# Patient Record
Sex: MALE | Race: WHITE
[De-identification: names, ages, dates, MRNs, and addresses within clinical notes are randomized per-mention and may not be internally consistent; named-entity substitution may affect disease eponyms.]

---

## 2017-08-25 ENCOUNTER — HOSPITAL ENCOUNTER (OUTPATIENT)
Dept: HOSPITAL 47 - LABWHC1 | Age: 57
Discharge: HOME | End: 2017-08-25
Payer: COMMERCIAL

## 2017-08-25 DIAGNOSIS — C61: Primary | ICD-10-CM

## 2017-08-25 PROCEDURE — 84153 ASSAY OF PSA TOTAL: CPT

## 2017-08-25 PROCEDURE — 36415 COLL VENOUS BLD VENIPUNCTURE: CPT

## 2018-01-26 ENCOUNTER — HOSPITAL ENCOUNTER (OUTPATIENT)
Dept: HOSPITAL 47 - LABWHC1 | Age: 58
Discharge: HOME | End: 2018-01-26
Attending: RADIOLOGY
Payer: COMMERCIAL

## 2018-01-26 DIAGNOSIS — C61: Primary | ICD-10-CM

## 2018-01-26 PROCEDURE — 36415 COLL VENOUS BLD VENIPUNCTURE: CPT

## 2018-01-26 PROCEDURE — 84153 ASSAY OF PSA TOTAL: CPT

## 2018-08-14 ENCOUNTER — HOSPITAL ENCOUNTER (OUTPATIENT)
Dept: HOSPITAL 47 - LABWHC1 | Age: 58
Discharge: HOME | End: 2018-08-14
Attending: RADIOLOGY
Payer: COMMERCIAL

## 2018-08-14 DIAGNOSIS — C61: Primary | ICD-10-CM

## 2018-08-14 PROCEDURE — 84153 ASSAY OF PSA TOTAL: CPT

## 2018-08-14 PROCEDURE — 36415 COLL VENOUS BLD VENIPUNCTURE: CPT

## 2018-09-28 ENCOUNTER — HOSPITAL ENCOUNTER (OUTPATIENT)
Dept: HOSPITAL 47 - RADCTMAIN | Age: 58
Discharge: HOME | End: 2018-09-28
Attending: RADIOLOGY
Payer: COMMERCIAL

## 2018-09-28 DIAGNOSIS — Z12.2: Primary | ICD-10-CM

## 2018-09-28 DIAGNOSIS — Z87.891: ICD-10-CM

## 2018-09-28 NOTE — CTL
EXAMINATION TYPE:  CT Low Dose Lung

 

DATE OF EXAM ORDERED: 9/28/2018

 

COMPARISON:

 

HISTORY: . Low Dose CT Lung Screening

 

CT DLP: 113.5 mGycm

CT CTDI: 3.00 mGy

 

IV CONTRAST USED: None.

SCREENING VISIT: First visit

COMPARISON: None.

 

TECHNIQUE: Low dose computed tomography scan was performed through the chest at 1 millimeter thick se
ctions and reconstructed images in the coronal plane at 1 mm thick sections.

 

CT DIAGNOSTIC QUALITY:  Satisfactory

 

FINDINGS:

 

LUNG NODULES:  Not present

 

Left lung: no nodules identified.

Right lung: no nodules identified.

 

LUNGS:

COPD: Severity: None  

Fibrosis: Severity:None   

Lymph nodes: None  

Other findings: None  

 

RIGHT PLEURAL SPACE: 

Effusion: None  

Calcification: None   

Thickening: None  

Pneumothorax: None  

 

LEFT PLEURAL SPACE: 

Effusion: None  

Calcification: None  

Thickening: None   

Pneumothorax: None  

 

HEART:  

Heart Size: Mildly enlarged

Coronary calcification: Mild 

Pericardial effusion: None   

 

OTHER FINDINGS:

Upper abdomen: No significant abnormality

Bony thorax: Degenerative changes

Supraclavicular region: No significant abnormalityOther: No significant abnormalityI

 

IMPRESSION: Negative

 

FOLLOW UP CT CHEST 

 

RECOMMENDATION: Follow-up screening in one year

 

CT LUNG RAD: 

 

LUNG RAD CATEGORY 1 negative

## 2019-02-01 ENCOUNTER — HOSPITAL ENCOUNTER (OUTPATIENT)
Dept: HOSPITAL 47 - LABWHC1 | Age: 59
Discharge: HOME | End: 2019-02-01
Attending: RADIOLOGY
Payer: COMMERCIAL

## 2019-02-01 DIAGNOSIS — Z92.3: ICD-10-CM

## 2019-02-01 DIAGNOSIS — C61: Primary | ICD-10-CM

## 2019-02-01 DIAGNOSIS — Z87.891: ICD-10-CM

## 2019-02-01 LAB
ANION GAP SERPL CALC-SCNC: 10.8 MMOL/L (ref 4–12)
BASOPHILS # BLD AUTO: 0.1 K/UL (ref 0–0.2)
BASOPHILS NFR BLD AUTO: 1 %
BUN SERPL-SCNC: 18 MG/DL (ref 9–27)
CALCIUM SPEC-MCNC: 9.1 MG/DL (ref 8.7–10.3)
CHLORIDE SERPL-SCNC: 107 MMOL/L (ref 96–109)
CO2 SERPL-SCNC: 23.2 MMOL/L (ref 21.6–31.8)
EOSINOPHIL # BLD AUTO: 0.3 K/UL (ref 0–0.7)
EOSINOPHIL NFR BLD AUTO: 5 %
ERYTHROCYTE [DISTWIDTH] IN BLOOD BY AUTOMATED COUNT: 5.15 M/UL (ref 4.3–5.9)
ERYTHROCYTE [DISTWIDTH] IN BLOOD: 14.1 % (ref 11.5–15.5)
GLUCOSE SERPL-MCNC: 105 MG/DL (ref 70–110)
HCT VFR BLD AUTO: 44.2 % (ref 39–53)
HGB BLD-MCNC: 14.5 GM/DL (ref 13–17.5)
LYMPHOCYTES # SPEC AUTO: 1.3 K/UL (ref 1–4.8)
LYMPHOCYTES NFR SPEC AUTO: 24 %
MCH RBC QN AUTO: 28.3 PG (ref 25–35)
MCHC RBC AUTO-ENTMCNC: 32.9 G/DL (ref 31–37)
MCV RBC AUTO: 85.9 FL (ref 80–100)
MONOCYTES # BLD AUTO: 0.3 K/UL (ref 0–1)
MONOCYTES NFR BLD AUTO: 6 %
NEUTROPHILS # BLD AUTO: 3.4 K/UL (ref 1.3–7.7)
NEUTROPHILS NFR BLD AUTO: 64 %
PLATELET # BLD AUTO: 166 K/UL (ref 150–450)
POTASSIUM SERPL-SCNC: 4.1 MMOL/L (ref 3.5–5.5)
SODIUM SERPL-SCNC: 141 MMOL/L (ref 135–145)
WBC # BLD AUTO: 5.3 K/UL (ref 3.8–10.6)

## 2019-02-01 PROCEDURE — 36415 COLL VENOUS BLD VENIPUNCTURE: CPT

## 2019-02-01 PROCEDURE — 84153 ASSAY OF PSA TOTAL: CPT

## 2019-02-01 PROCEDURE — 85025 COMPLETE CBC W/AUTO DIFF WBC: CPT

## 2019-02-01 PROCEDURE — 80048 BASIC METABOLIC PNL TOTAL CA: CPT

## 2019-03-08 ENCOUNTER — HOSPITAL ENCOUNTER (OUTPATIENT)
Dept: HOSPITAL 47 - RADCTMAIN | Age: 59
Discharge: HOME | End: 2019-03-08
Attending: UROLOGY
Payer: COMMERCIAL

## 2019-03-08 DIAGNOSIS — N32.89: Primary | ICD-10-CM

## 2019-03-08 PROCEDURE — 74400 UROGRAPHY IV +-KUB TOMOG: CPT

## 2019-03-08 PROCEDURE — 74178 CT ABD&PLV WO CNTR FLWD CNTR: CPT

## 2019-03-08 NOTE — CT
EXAMINATION TYPE: CT urogram wo/w con

 

DATE OF EXAM: 3/8/2019

 

COMPARISON: None

 

HISTORY: Gross hematuria.

 

CT DLP: 1275.5 mGycm, Automated Exposure Control for Dose Reduction was Utilized.

 

CONTRAST: 

CT scan of the abdomen and pelvis is performed with oral and without and with IV Contrast, patient in
jected with 100ml mL of Isovue 300.

 

FINDINGS:

 

LUNG BASES:  No significant abnormality is appreciated.

 

LIVER/GB: Too small to accurately characterize hepatic lesion near the fissure for the falciform liga
ment as seen on series 6 image 13 that is very hypoattenuated favored to represent a small benign cys
t. No cholelithiasis on the unenhanced images

 

PANCREAS: There is severe pancreatic parenchymal atrophy. No ductal dilatation is noted.

 

SPLEEN:  No significant abnormality is seen.

 

ADRENALS:  No significant abnormality is seen.

 

KIDNEYS: The unenhanced images demonstrate no evidence of nephrolithiasis or hydronephrosis. Kidneys 
enhance symmetrically and excrete symmetrically without identifiable renal mass or uroepithelial thic
kening. Only a trace amount of contrast is seen within the incompletely distended urinary bladder, li
miting evaluation.

 

BOWEL: No dilated large or small bowel. There is lipomatous hypertrophy of the ileocecal valve. Few c
olonic diverticula are present without pericolonic fat stranding. Moderate amount retained colon and 
stool is seen. Lack of oral contrast limits evaluation of the bowel. Prior ventral hernia repair is n
oted over the inferior abdomen.

 

PROSTATE/SEMINAL VESICLES: Prostate gland is slightly heterogenous.

 

LYMPH NODES:  No greater than 1cm abdominal or pelvic lymph nodes are appreciated.

 

OSSEOUS STRUCTURES:  No significant abnormality is seen.

 

IMPRESSION:  No hydronephrosis, nephrolithiasis, or suspicious renal mass. No uroepithelial thickenin
g is seen. Urinary bladder is incompletely filled and incompletely distended, suboptimally evaluated.

## 2019-07-26 ENCOUNTER — HOSPITAL ENCOUNTER (OUTPATIENT)
Dept: HOSPITAL 47 - LABWHC1 | Age: 59
Discharge: HOME | End: 2019-07-26
Attending: RADIOLOGY
Payer: COMMERCIAL

## 2019-07-26 DIAGNOSIS — Z92.3: ICD-10-CM

## 2019-07-26 DIAGNOSIS — C61: Primary | ICD-10-CM

## 2019-07-26 DIAGNOSIS — Z79.818: ICD-10-CM

## 2019-07-26 DIAGNOSIS — Z87.891: ICD-10-CM

## 2019-07-26 PROCEDURE — 84153 ASSAY OF PSA TOTAL: CPT

## 2019-07-26 PROCEDURE — 36415 COLL VENOUS BLD VENIPUNCTURE: CPT

## 2019-10-11 ENCOUNTER — HOSPITAL ENCOUNTER (OUTPATIENT)
Dept: HOSPITAL 47 - RADCTMAIN | Age: 59
Discharge: HOME | End: 2019-10-11
Attending: RADIOLOGY
Payer: COMMERCIAL

## 2019-10-11 DIAGNOSIS — Z87.891: ICD-10-CM

## 2019-10-11 DIAGNOSIS — R91.1: ICD-10-CM

## 2019-10-11 DIAGNOSIS — Z12.2: Primary | ICD-10-CM

## 2019-10-11 NOTE — CTL
EXAMINATION TYPE:  CT Low Dose Lung

 

DATE OF EXAM ORDERED: 10/11/2019

 

HISTORY: Long-term tobacco use. Lung cancer screening

 

CT DLP: 62.96 mGycm

CT CTDI: 1.88 mGy

Automated exposure control for dose reduction was used.

 

SCREENING VISIT: Second visit

 

COMPARISON: Low-dose lung screening CT September 28, 2018

 

TECHNIQUE: Low dose computed tomography scan was performed through the chest at 1 mm thick sections a
nd reconstructed images in the coronal plane at 1 mm thick sections.

 

CT DIAGNOSTIC QUALITY: Satisfactory

 

FINDINGS:

LUNG NODULES: Present, detailed below:

 

There is 4 x 3 mm right upper lobe nodule axial image 73.

 

There is 3 x 3 mm left upper lobe nodule anteriorly axial image 95.

 

In retrospect both were present on prior study coronal series 10 image 30 and 24 respectively

 

LUNGS:

COPD: Severity: None

Fibrosis: Severity: None

Lymph nodes: None

Other findings: None

 

BILATERAL PLEURAL SPACE:

Effusion: None

Calcification: None

Thickening: None

Pneumothorax: None

 

HEART:  

Heart Size: Normal

Coronary calcification: Minimal

Pericardial effusion: None

 

OTHER FINDINGS:

Upper abdomen: Fairly severe fatty-replaced atrophy of the pancreas is redemonstrated.

Bony thorax: Mild multilevel spurring lower thoracic spine.

Supraclavicular region: None.

Other: None.

 

IMPRESSION: Stable small nodules. No new enlarging nodules.

 

FOLLOW UP CT CHEST RECOMMENDATION: Annual low-dose lung screening CT

CT LUNG RAD: Lung-Rad 2 Benign Appearance or Behavior

## 2020-01-31 ENCOUNTER — HOSPITAL ENCOUNTER (OUTPATIENT)
Dept: HOSPITAL 47 - LABWHC1 | Age: 60
Discharge: HOME | End: 2020-01-31
Attending: RADIOLOGY
Payer: COMMERCIAL

## 2020-01-31 DIAGNOSIS — Z92.3: ICD-10-CM

## 2020-01-31 DIAGNOSIS — C61: Primary | ICD-10-CM

## 2020-01-31 DIAGNOSIS — Z79.818: ICD-10-CM

## 2020-01-31 DIAGNOSIS — Z87.891: ICD-10-CM

## 2020-01-31 PROCEDURE — 36415 COLL VENOUS BLD VENIPUNCTURE: CPT

## 2020-01-31 PROCEDURE — 84153 ASSAY OF PSA TOTAL: CPT

## 2020-12-18 ENCOUNTER — HOSPITAL ENCOUNTER (OUTPATIENT)
Dept: HOSPITAL 47 - RADCTMAIN | Age: 60
Discharge: HOME | End: 2020-12-18
Attending: FAMILY MEDICINE
Payer: COMMERCIAL

## 2020-12-18 DIAGNOSIS — Z12.2: Primary | ICD-10-CM

## 2020-12-18 DIAGNOSIS — Z87.891: ICD-10-CM

## 2020-12-18 NOTE — CTL
EXAMINATION TYPE:  CT Low Dose Lung

 

DATE OF EXAM ORDERED: 12/18/2020

 

HISTORY: . Lung cancer screening

 

CT DLP: 97.30 mGycm

CT CTDI: 2.90 mGy

Automated exposure control for dose reduction was used.

 

SCREENING VISIT:

 

COMPARISON: 10/11/2019

 

TECHNIQUE: Low dose computed tomography scan was performed through the chest at 1 mm thick sections a
nd reconstructed images in the coronal plane at 1 mm thick sections.

 

CT DIAGNOSTIC QUALITY: Satisfactory

 

FINDINGS:

LUNG NODULES:

Stable 4 x 3 mm right upper lobe pulmonary nodule.

Stable 3 x 3 mm left upper lobe pulmonary nodule.

 

LUNGS:

Lungs are clear. No pleural effusion or pneumothorax.

No pleural effusion or pneumothorax. No overt failure. Mild emphysematous changes are seen.

No pleural thickening or calcification. No pneumothorax.

 

Hypertrophic and degenerative changes spine. Chronic appearing deformity sternum. Heart size is stabl
e. No sizable pericardial effusion. Very minimal calcification near the region of the aortic valve. M
ild coronary artery calcification.

 

 

 

IMPRESSION: 

1. Stable sub-5 mm pulmonary nodules bilaterally.

 

CT LUNG RAD AND CT CHEST RECOMMENDATION: Lung-Rad 2 Benign Appearance or Behavior: Continue annual sc
reening with LDCT in 12 months.

## 2021-02-05 ENCOUNTER — HOSPITAL ENCOUNTER (OUTPATIENT)
Dept: HOSPITAL 47 - LABWHC1 | Age: 61
Discharge: HOME | End: 2021-02-05
Payer: COMMERCIAL

## 2021-02-05 DIAGNOSIS — Z08: Primary | ICD-10-CM

## 2021-02-05 DIAGNOSIS — Z87.891: ICD-10-CM

## 2021-02-05 DIAGNOSIS — Z85.46: ICD-10-CM

## 2021-02-05 DIAGNOSIS — C61: ICD-10-CM

## 2021-02-05 DIAGNOSIS — Z92.3: ICD-10-CM

## 2021-02-05 DIAGNOSIS — Z79.818: ICD-10-CM

## 2021-02-05 PROCEDURE — 36415 COLL VENOUS BLD VENIPUNCTURE: CPT

## 2021-02-05 PROCEDURE — 84153 ASSAY OF PSA TOTAL: CPT

## 2021-04-12 ENCOUNTER — HOSPITAL ENCOUNTER (OUTPATIENT)
Dept: HOSPITAL 47 - RADUSWWP | Age: 61
Discharge: HOME | End: 2021-04-12
Attending: UROLOGY
Payer: COMMERCIAL

## 2021-04-12 DIAGNOSIS — R31.9: Primary | ICD-10-CM

## 2021-04-12 PROCEDURE — 76770 US EXAM ABDO BACK WALL COMP: CPT

## 2021-04-12 NOTE — US
EXAMINATION TYPE: US kidneys/renal and bladder

 

DATE OF EXAM: 4/12/2021

 

COMPARISON: CT urogram March 8, 2019

 

CLINICAL HISTORY: R31.9 Hematuria. Hematuria x 3 weeks; history of prior cystoscopy x 2.

 

EXAM MEASUREMENTS:

 

Right Kidney:  10.3 x 5.0 x 4.8 cm

Left Kidney: 9.9 x 5.2 x 4.8 cm

Post Void Residual Volume:  23.0 mL

 

 

 

Right Kidney: No hydronephrosis or masses seen  

Left Kidney: prominent renal pelvis is noted      

Bladder: hypoechoic area is noted on posterior wall and may be debris vs. scar tissue vs. mass

**Bilateral Jets seen: yes

**Normal Post Void Residual: yes

 

Bladder not greatly distended making evaluation for focal masses suboptimal. Eccentric wall thickenin
g posterior or deep aspect are present.  Bilateral ureteral jets are seen.

 

Both kidneys seen . Prominent left renal pelvis with lower pole calyceal dilatation After voiding min
imal residual urine with persistent eccentric wall thickening.

 

 

 

IMPRESSION: Persistent suspicious eccentric wall thickening in bladder is concerning in patient with 
known hematuria. Direct visualization is advised to rule out neoplasm if has not been performed recen
tly. Suspicion for new mild left-sided hydronephrosis.

## 2021-04-25 ENCOUNTER — HOSPITAL ENCOUNTER (EMERGENCY)
Dept: HOSPITAL 47 - EC | Age: 61
Discharge: HOME | End: 2021-04-25
Payer: COMMERCIAL

## 2021-04-25 VITALS
RESPIRATION RATE: 18 BRPM | SYSTOLIC BLOOD PRESSURE: 145 MMHG | DIASTOLIC BLOOD PRESSURE: 81 MMHG | TEMPERATURE: 97.9 F | HEART RATE: 111 BPM

## 2021-04-25 DIAGNOSIS — Z87.891: ICD-10-CM

## 2021-04-25 DIAGNOSIS — R31.0: Primary | ICD-10-CM

## 2021-04-25 LAB
RBC UR QL: >182 /HPF (ref 0–5)
WBC # UR AUTO: 27 /HPF (ref 0–5)

## 2021-04-25 PROCEDURE — 99283 EMERGENCY DEPT VISIT LOW MDM: CPT

## 2021-04-25 PROCEDURE — 81001 URINALYSIS AUTO W/SCOPE: CPT

## 2021-04-25 PROCEDURE — 51798 US URINE CAPACITY MEASURE: CPT

## 2021-04-25 PROCEDURE — 87086 URINE CULTURE/COLONY COUNT: CPT

## 2021-04-25 NOTE — ED
Male Urogenital HPI





- General


Stated complaint: Urine Retention


Time Seen by Provider: 04/25/21 02:25


Source: RN notes reviewed, old records reviewed





- History of Present Illness


MD Complaint: dysuria, other (hematuria)


-: month(s)


Location: abdomen


Radiation: none


Severity: mild


Severity scale (1-10): 2


Consistency: constant


Improves with: none


Worsens with: urination


recent surgery


Reports: urinary retention, blood in urine





- Related Data


                                Home Medications











 Medication  Instructions  Recorded  Confirmed


 


No Known Home Medications  11/01/16 11/01/16











                                    Allergies











Allergy/AdvReac Type Severity Reaction Status Date / Time


 


No Known Allergies Allergy   Verified 04/25/21 02:30














Review of Systems


ROS Statement: 


Those systems with pertinent positive or pertinent negative responses have been 

documented in the HPI.





ROS Other: All systems not noted in ROS Statement are negative.





Past Medical History


Past Medical History: No Reported History


History of Any Multi-Drug Resistant Organisms: None Reported


Past Surgical History: Hernia Repair, Orthopedic Surgery


Additional Past Surgical History / Comment(s): arthoscopy knee,


Past Anesthesia/Blood Transfusion Reactions: No Reported Reaction


Past Alcohol Use History: Rare


Additional Past Alcohol Use History / Comment(s): smoked since age 11  1ppd quit 

06/16


Past Drug Use History: None Reported





- Past Family History


  ** Mother


Family Medical History: Diabetes Mellitus





General Exam


General appearance: alert, in no apparent distress


Head exam: Present: atraumatic, normocephalic, normal inspection


Eye exam: Present: normal appearance, PERRL, EOMI.  Absent: scleral icterus, 

conjunctival injection, periorbital swelling


ENT exam: Present: normal exam, mucous membranes moist


Neck exam: Present: normal inspection.  Absent: tenderness, meningismus, 

lymphadenopathy


Respiratory exam: Present: normal lung sounds bilaterally.  Absent: respiratory 

distress, wheezes, rales, rhonchi, stridor


Cardiovascular Exam: Present: regular rate, normal rhythm, normal heart sounds. 

Absent: systolic murmur, diastolic murmur, rubs, gallop, clicks


GI/Abdominal exam: Present: soft, normal bowel sounds.  Absent: distended, 

tenderness, guarding, rebound, rigid


Extremities exam: Present: normal inspection, full ROM, normal capillary refill.

 Absent: tenderness, pedal edema, joint swelling, calf tenderness


Back exam: Present: normal inspection


Neurological exam: Present: alert, oriented X3, CN II-XII intact


Psychiatric exam: Present: normal affect, normal mood


Skin exam: Present: warm, dry, intact, normal color.  Absent: rash





Course


                                   Vital Signs











  04/25/21





  02:27


 


Temperature 97.9 F


 


Pulse Rate 111 H


 


Respiratory 18





Rate 


 


Blood Pressure 145/81


 


O2 Sat by Pulse 98





Oximetry 














Disposition


Clinical Impression: 


 Acute retention of urine, Gross hematuria





Disposition: HOME SELF-CARE


Condition: Good


Instructions (If sedation given, give patient instructions):  Urinary Tract 

Infection in Men (ED), Hematuria (ED), Urinary Retention in Men (ED)


Is patient prescribed a controlled substance at d/c from ED?: No


Referrals: 


Es Roman DO [Primary Care Provider] - 1-2 days


Saad Mojica MD [STAFF PHYSICIAN] - 1-2 days

## 2021-04-29 ENCOUNTER — HOSPITAL ENCOUNTER (OUTPATIENT)
Dept: HOSPITAL 47 - LABPAT | Age: 61
Discharge: HOME | End: 2021-04-29
Attending: UROLOGY
Payer: COMMERCIAL

## 2021-04-29 DIAGNOSIS — D49.4: ICD-10-CM

## 2021-04-29 DIAGNOSIS — N30.41: ICD-10-CM

## 2021-04-29 DIAGNOSIS — R31.1: ICD-10-CM

## 2021-04-29 DIAGNOSIS — Z01.812: Primary | ICD-10-CM

## 2021-04-29 LAB
ANION GAP SERPL CALC-SCNC: 8 MMOL/L
BASOPHILS # BLD AUTO: 0 K/UL (ref 0–0.2)
BASOPHILS NFR BLD AUTO: 1 %
BUN SERPL-SCNC: 14 MG/DL (ref 9–20)
CALCIUM SPEC-MCNC: 9.2 MG/DL (ref 8.4–10.2)
CHLORIDE SERPL-SCNC: 107 MMOL/L (ref 98–107)
CO2 SERPL-SCNC: 22 MMOL/L (ref 22–30)
EOSINOPHIL # BLD AUTO: 0.2 K/UL (ref 0–0.7)
EOSINOPHIL NFR BLD AUTO: 3 %
ERYTHROCYTE [DISTWIDTH] IN BLOOD BY AUTOMATED COUNT: 5.35 M/UL (ref 4.3–5.9)
ERYTHROCYTE [DISTWIDTH] IN BLOOD: 14 % (ref 11.5–15.5)
GLUCOSE SERPL-MCNC: 140 MG/DL (ref 74–99)
HCT VFR BLD AUTO: 44.9 % (ref 39–53)
HGB BLD-MCNC: 15.6 GM/DL (ref 13–17.5)
LYMPHOCYTES # SPEC AUTO: 0.9 K/UL (ref 1–4.8)
LYMPHOCYTES NFR SPEC AUTO: 15 %
MCH RBC QN AUTO: 29.1 PG (ref 25–35)
MCHC RBC AUTO-ENTMCNC: 34.7 G/DL (ref 31–37)
MCV RBC AUTO: 83.9 FL (ref 80–100)
MONOCYTES # BLD AUTO: 0.4 K/UL (ref 0–1)
MONOCYTES NFR BLD AUTO: 6 %
NEUTROPHILS # BLD AUTO: 4.1 K/UL (ref 1.3–7.7)
NEUTROPHILS NFR BLD AUTO: 73 %
PLATELET # BLD AUTO: 173 K/UL (ref 150–450)
POTASSIUM SERPL-SCNC: 3.8 MMOL/L (ref 3.5–5.1)
SODIUM SERPL-SCNC: 137 MMOL/L (ref 137–145)
WBC # BLD AUTO: 5.5 K/UL (ref 3.8–10.6)

## 2021-04-29 PROCEDURE — 36415 COLL VENOUS BLD VENIPUNCTURE: CPT

## 2021-04-29 PROCEDURE — 80048 BASIC METABOLIC PNL TOTAL CA: CPT

## 2021-04-29 PROCEDURE — 85025 COMPLETE CBC W/AUTO DIFF WBC: CPT

## 2021-05-04 NOTE — P.GSHP
History of Present Illness


H&P Date: 05/04/21


Chief Complaint: Gross Hematuria


The patient is a 60-year-old white male diagnosed with prostate cancer in April 2017.  He was treated with androgen deprivation therapy and radiation therapy.  

His PSA level was 0.1 in December 2020.  He underwent cystoscopy with 

electrocautery for radiation cystitis in 2019.  He now presents back with 

recurrent gross hematuria.  Renal ultrasound shows a prominent left renal pelvis

and bladder wall thickening.  Cystoscopy shows changes involving the left 

hemitrigone, which likely represent radiation cystitis though there is some 

concern of possible urothelial carcinoma.  He comes for resection.








- Cardiovascular


Cardiovascular: Reports high blood pressure





- Genitourinary (Male)


Genitourinary: Reports hematuria, Denies flank pain





Past Medical History


Past Medical History: Cancer, Diabetes Mellitus, Hypertension


Additional Past Medical History / Comment(s): Hx Prostate Cancer 5-10 yrs ago 

treated with radiation. "Currently urinating blood and blood clots, thought to 

be related to hx of radiation for Prostate Cancer." "They say I have Diabetes 

but I don't take anything for or check any blood sugars." Had Covid Vaccines.


History of Any Multi-Drug Resistant Organisms: None Reported


Past Surgical History: Hernia Repair, Orthopedic Surgery


Additional Past Surgical History / Comment(s): Arthoscopic knee surgery(can't 

remember which knee).


Past Anesthesia/Blood Transfusion Reactions: No Reported Reaction


Past Psychological History: No Psychological Hx Reported


Smoking Status: Former smoker


Past Alcohol Use History: Rare


Additional Past Alcohol Use History / Comment(s): Smoked since age 11, 1ppd, quit

06/16.


Past Drug Use History: None Reported





- Past Family History


  ** Mother


Family Medical History: Diabetes Mellitus





Medications and Allergies


                                Home Medications











 Medication  Instructions  Recorded  Confirmed  Type


 


Ciprofloxacin HCl [Cipro] 500 mg PO BID 04/30/21 04/30/21 History


 


Tamsulosin [Flomax] 0.4 mg PO HS 04/30/21 04/30/21 History


 


amLODIPine BESYLATE 10 mg PO QAM 04/30/21 04/30/21 History


 


lisinopriL 40 mg PO HS 04/30/21 04/30/21 History








                                    Allergies











Allergy/AdvReac Type Severity Reaction Status Date / Time


 


No Known Allergies Allergy   Verified 04/30/21 11:25














Surgical - Exam





- General


well developed, well nourished, no distress





- Respiratory


normal respiratory effort





- Abdomen


Abdomen: soft, non tender, no guarding, no rigid, no rebound





- Genitourinary


normal penis with no external lesions, testicles non-tender





- Psychiatric


oriented to time, oriented to person, oriented to place, speech is normal, 

memory intact





Assessment and Plan


(1) Gross hematuria


Status: Acute   Code(s): R31.0 - GROSS HEMATURIA   SNOMED Code(s): 472647938


   


Plan: 


Cystoscopy, transurethral resection with fulguration of left hemitrigone lesion.

  The procedure has been reviewed in detail with the patient.  He is aware of 

potential risks, which include anesthesia, persistent bleeding, infection, 

persistent radiation cystitis, and scarring of the left ureteral orifice.

## 2021-05-06 ENCOUNTER — HOSPITAL ENCOUNTER (OUTPATIENT)
Dept: HOSPITAL 47 - OR | Age: 61
Discharge: HOME | End: 2021-05-06
Attending: UROLOGY
Payer: COMMERCIAL

## 2021-05-06 VITALS — BODY MASS INDEX: 27.4 KG/M2

## 2021-05-06 VITALS — HEART RATE: 82 BPM | SYSTOLIC BLOOD PRESSURE: 142 MMHG | DIASTOLIC BLOOD PRESSURE: 76 MMHG

## 2021-05-06 VITALS — RESPIRATION RATE: 16 BRPM

## 2021-05-06 VITALS — TEMPERATURE: 97.9 F

## 2021-05-06 DIAGNOSIS — R73.03: ICD-10-CM

## 2021-05-06 DIAGNOSIS — Z87.891: ICD-10-CM

## 2021-05-06 DIAGNOSIS — Z79.899: ICD-10-CM

## 2021-05-06 DIAGNOSIS — Z85.46: ICD-10-CM

## 2021-05-06 DIAGNOSIS — Z83.3: ICD-10-CM

## 2021-05-06 DIAGNOSIS — Z98.890: ICD-10-CM

## 2021-05-06 DIAGNOSIS — N30.41: Primary | ICD-10-CM

## 2021-05-06 DIAGNOSIS — I10: ICD-10-CM

## 2021-05-06 LAB — GLUCOSE BLD-MCNC: 121 MG/DL (ref 75–99)

## 2021-05-06 PROCEDURE — 88307 TISSUE EXAM BY PATHOLOGIST: CPT

## 2021-05-06 PROCEDURE — 52235 CYSTOSCOPY AND TREATMENT: CPT

## 2021-05-06 NOTE — P.OP
Date of Procedure: 05/06/21


Preoperative Diagnosis: 


Bladder neoplasm, radiation cystitis


Postoperative Diagnosis: 


Same


Procedure(s) Performed: 


Cystoscopy, TURBT (Medium)


Anesthesia: BALDEMARA


Surgeon: Khanh Lomas


Estimated Blood Loss (ml): 5


IV fluids (ml): 600


Pathology: other (Bladder lesion fragments, left hemitrigone)


Condition: stable


Disposition: PACU


Indications for Procedure: 


The patient is a 60-year-old white male diagnosed with prostate cancer in April 2017.  He was treated with androgen deprivation therapy and radiation therapy.  

His PSA level was 0.1 in December 2020.  He underwent cystoscopy with 

electrocautery for radiation cystitis in 2019.  He now presents back with 

recurrent gross hematuria.  Renal ultrasound shows a prominent left renal pelvis

and bladder wall thickening.  Cystoscopy shows changes involving the left 

hemitrigone, which likely represent radiation cystitis though there is some 

concern of possible urothelial carcinoma.  He comes for resection.


Operative Findings: 


Bladder wall thickening and erythema, left hemitrigone.


Description of Procedure: 


The patient was taken in the operating room and placed in the dorsal lithotomy 

position, with his legs supported in Dar stirrups.  The external genitalia was

prepped and draped sterilely.  The 25-German ACMI resectoscope sheath was 

introduced into the bladder under direct vision.  The urethra appeared normal.  

The prostatic urethra was unremarkable, showing no evidence of inflammation or 

obstruction.  The bladder was inspected.  The right ureteral orifice appeared 

normal.  The left hemitrigone was raised, thickened, and erythematous.  The left

ureteral orifice was not identified.  The focal area of erythema was seen at the

posterior bladder dome.  





Using the bipolar cutting loop, the left hemitrigone was resected.  The tissue 

was thick and dense.  Following resection, the left ureteral orifice was 

identified.  It was not dilated.  The resection bed was fulgurated, other than 

the area surrounding the left ureteral orifice.  Excellent hemostasis was 

attained.  The resected tissue was saved and sent for pathologic examination.  

An 18-German Mock catheter was inserted.  The return was  clear.  The patient 

tolerated the procedure well.  He was taken to the recovery room in stable 

condition.

## 2021-06-10 ENCOUNTER — HOSPITAL ENCOUNTER (OUTPATIENT)
Dept: HOSPITAL 47 - RADUSWWP | Age: 61
Discharge: HOME | End: 2021-06-10
Attending: UROLOGY
Payer: COMMERCIAL

## 2021-06-10 DIAGNOSIS — N13.30: Primary | ICD-10-CM

## 2021-06-10 PROCEDURE — 76770 US EXAM ABDO BACK WALL COMP: CPT

## 2021-06-10 NOTE — US
EXAMINATION TYPE: US kidneys/renal and bladder

 

DATE OF EXAM: 6/10/2021

 

COMPARISON: 4/1/2021

 

CLINICAL HISTORY: N13.30 Unspecified hydronephrosis.

 

EXAM MEASUREMENTS:

 

Right Kidney:  10.7 x 4.6 x 4.6 cm

Left Kidney: 10.4 x 4.4 x 4.8 cm

 

Right Kidney: mild hydronephrosis  

Left Kidney: mild hydronephrosis  

Bladder: wnl

 

IMPRESSION:

 

1. Mild bilateral hydronephrosis. No ultrasound abnormality to account for this finding is evident.

## 2021-07-16 ENCOUNTER — HOSPITAL ENCOUNTER (OUTPATIENT)
Dept: HOSPITAL 47 - RADUSWWP | Age: 61
Discharge: HOME | End: 2021-07-16
Attending: UROLOGY
Payer: COMMERCIAL

## 2021-07-16 DIAGNOSIS — Z09: Primary | ICD-10-CM

## 2021-07-16 PROCEDURE — 76770 US EXAM ABDO BACK WALL COMP: CPT

## 2021-07-16 NOTE — US
EXAMINATION TYPE: US kidneys/renal and bladder

 

DATE OF EXAM: 7/16/2021

 

COMPARISON: US

 

CLINICAL HISTORY: N13.30 Hydronephrosis. F/U prior

 

EXAM MEASUREMENTS:

 

Right Kidney:  10.5 x 5.2 x 4.7 cm

Left Kidney: 10.4 x 4.8 x 4.5 cm

 

 

 

 

Right Kidney: Appeared wnl, prior mild hydro appeared to have resolved  

Left Kidney: Appeared wnl, prior mild hydro appeared to have resolved  

Bladder: Unable to evaluate properly- Per Dr's order, pt not given prep and bladder was to be empty f
or exam

 

 

There is no evidence for hydronephrosis at this point in time.  No nephrolithiasis is seen.  No zulema
s are identified.  

 

 

 

IMPRESSION:

Interval resolution of bilateral hydronephrosis.

## 2021-12-17 ENCOUNTER — HOSPITAL ENCOUNTER (OUTPATIENT)
Dept: HOSPITAL 47 - RADCTMAIN | Age: 61
Discharge: HOME | End: 2021-12-17
Attending: RADIOLOGY
Payer: COMMERCIAL

## 2021-12-17 DIAGNOSIS — Z87.891: ICD-10-CM

## 2021-12-17 DIAGNOSIS — Z12.2: Primary | ICD-10-CM

## 2021-12-17 PROCEDURE — 71271 CT THORAX LUNG CANCER SCR C-: CPT

## 2021-12-17 NOTE — CTL
EXAMINATION TYPE:  CT Low Dose Lung

 

DATE OF EXAM ORDERED: 12/17/2021

 

HISTORY: Long-term tobacco use. Lung cancer screening

 

CT DLP: 97 mGycm

CT CTDI: 2.8 mGy

Automated exposure control for dose reduction was used.

 

SCREENING VISIT: Third after baseline

 

COMPARISON: Prior studies 2020 through 2018.

 

TECHNIQUE: Low dose computed tomography scan was performed through the chest at 1 mm thick sections a
nd reconstructed images in multiple planes at 1 mm and 5 mm thick sections.

 

CT DIAGNOSTIC QUALITY: Satisfactory

 

FINDINGS:

LUNG NODULES: Present, detailed below: 

 

Stable 3 mm right upper lobe nodule axial image 65. 

Stable 2 to 3 mm anterior left upper lung nodule axial image 75.

 

No new or enlarging greater than 5 mm pulmonary nodules.

 

 

LUNGS:

COPD: Severity: Mild

Fibrosis: Severity: None

Lymph nodes: No greater than 1 cm

Other findings: None

 

RIGHT PLEURAL SPACE:

Effusion: None

Calcification: None

Thickening: None

Pneumothorax: None

 

LEFT PLEURAL SPACE:

Effusion: None

Calcification: None

Thickening: None

Pneumothorax: None

 

HEART:  

Heart Size: Normal

Coronary Calcification: Minimal

Pericardial Effusion: Normal

 

OTHER FINDINGS:

Upper abdomen:   Severe fat replaced atrophy pancreas redemonstrated 

Bony thorax: No suspicious abnormality.

Supraclavicular region: No suspicious findings

Other: None

 

IMPRESSION: No new or enlarging nodules.

 

CT LUNG RAD AND CT CHEST RECOMMENDATION: Lung-Rad 2 Benign Appearance or Behavior: Continue annual sc
reening with LDCT in 12 months.

 

S Modifier (other clinically significant findings): None

## 2022-01-28 ENCOUNTER — HOSPITAL ENCOUNTER (OUTPATIENT)
Dept: HOSPITAL 47 - LABWHC1 | Age: 62
Discharge: HOME | End: 2022-01-28
Attending: RADIOLOGY
Payer: COMMERCIAL

## 2022-01-28 DIAGNOSIS — Z79.818: ICD-10-CM

## 2022-01-28 DIAGNOSIS — Z08: Primary | ICD-10-CM

## 2022-01-28 DIAGNOSIS — C61: ICD-10-CM

## 2022-01-28 DIAGNOSIS — F17.210: ICD-10-CM

## 2022-01-28 DIAGNOSIS — Z85.46: ICD-10-CM

## 2022-01-28 DIAGNOSIS — Z92.3: ICD-10-CM

## 2022-01-28 PROCEDURE — 84153 ASSAY OF PSA TOTAL: CPT

## 2022-01-28 PROCEDURE — 36415 COLL VENOUS BLD VENIPUNCTURE: CPT

## 2023-01-13 ENCOUNTER — HOSPITAL ENCOUNTER (OUTPATIENT)
Dept: HOSPITAL 47 - RADCTMAIN | Age: 63
Discharge: HOME | End: 2023-01-13
Attending: RADIOLOGY
Payer: COMMERCIAL

## 2023-01-13 DIAGNOSIS — Z12.2: Primary | ICD-10-CM

## 2023-01-13 DIAGNOSIS — J44.9: ICD-10-CM

## 2023-01-13 DIAGNOSIS — I25.10: ICD-10-CM

## 2023-01-13 DIAGNOSIS — Z87.891: ICD-10-CM

## 2023-01-13 DIAGNOSIS — R91.8: ICD-10-CM

## 2023-01-13 PROCEDURE — 71271 CT THORAX LUNG CANCER SCR C-: CPT

## 2023-01-13 NOTE — CTL
EXAMINATION TYPE:  CT Low Dose Lung

 

DATE OF EXAM ORDERED: 1/13/2023

 

HISTORY: . Lung cancer screening

 

CT DLP: 104.3 mGycm

CT CTDI: 2.9 mGy

Automated exposure control for dose reduction was used.

 

SCREENING VISIT:

 

COMPARISON: 12/17/2021

 

TECHNIQUE: Low dose computed tomography scan was performed through the chest at 1 mm thick sections a
nd reconstructed images in multiple planes at 1 mm and 5 mm thick sections.

 

CT DIAGNOSTIC QUALITY: Satisfactory

 

FINDINGS:

Stable 3 mm right upper lobe nodule. 

Stable 2 to 3 mm anterior left upper lung nodule. 

 

Mild emphysematous changes are seen. No pleural effusion or pneumothorax. No focal pneumonia subsegme
ntal areas of consolidation most typical of

 

Structures of the upper abdomen demonstrate no definite abnormality. Hypertrophic degenerative change
 of the spine. Chronic appearing deformity of the sternum. Pancreas is atrophic.

 

There are mildly prominent and there is a trace of pericardial fluid. Coronary artery calcification n
oted. Aorta normal caliber with mild atherosclerotic changes noted pathologic adenopathy by noncontra
st technique.

 

IMPRESSION: 

1. Stable sub-5 mm pulmonary nodules which have a benign appearance.

2. Coronary artery calcification.

3. Mild COPD

 

CT LUNG RAD AND CT CHEST RECOMMENDATION: Lung-Rad 2 Benign Appearance or Behavior: Continue annual sc
reening with LDCT in 12 months.

 

S Modifier (other clinically significant findings): S

## 2023-01-20 ENCOUNTER — HOSPITAL ENCOUNTER (OUTPATIENT)
Dept: HOSPITAL 47 - LABWHC1 | Age: 63
Discharge: HOME | End: 2023-01-20
Attending: RADIOLOGY
Payer: COMMERCIAL

## 2023-01-20 DIAGNOSIS — Z08: Primary | ICD-10-CM

## 2023-01-20 DIAGNOSIS — Z79.818: ICD-10-CM

## 2023-01-20 DIAGNOSIS — Z85.46: ICD-10-CM

## 2023-01-20 DIAGNOSIS — F17.210: ICD-10-CM

## 2023-01-20 DIAGNOSIS — Z92.3: ICD-10-CM

## 2023-01-20 PROCEDURE — 36415 COLL VENOUS BLD VENIPUNCTURE: CPT

## 2023-01-20 PROCEDURE — 84153 ASSAY OF PSA TOTAL: CPT

## 2024-01-11 ENCOUNTER — HOSPITAL ENCOUNTER (OUTPATIENT)
Dept: HOSPITAL 47 - RADCTMAIN | Age: 64
Discharge: HOME | End: 2024-01-11
Attending: SURGERY
Payer: COMMERCIAL

## 2024-01-11 DIAGNOSIS — I70.0: ICD-10-CM

## 2024-01-11 DIAGNOSIS — I73.9: Primary | ICD-10-CM

## 2024-01-11 DIAGNOSIS — I70.8: ICD-10-CM

## 2024-01-11 PROCEDURE — 75635 CT ANGIO ABDOMINAL ARTERIES: CPT

## 2024-01-18 NOTE — CT
EXAMINATION TYPE: CT angio abd aorta w/Runoff

CT DLP: 1035.1 mGycm, Automated exposure control for dose reduction was used.

 

DATE OF EXAM: 1/11/2024 9:23 AM

 

COMPARISON: CT low dose lung 1/13/2023, CT urogram 3/8/2019. .

 

CLINICAL INDICATION:Male, 63 years old with history of I73.9 PVD; PHH, PVD, Lt calf pain

 

TECHNIQUE: Multiple thin slice sub-millimeter images were obtained after administration of contrast, 
from the lower chest through the ankles.  3-D reconstructed images and maximum intensity projection i
mages were "". CT angio abd aorta w/Runoff

CT Contrast:

Contrast used:125 mL of Isovue 370 without and with IV Contrast, 

Oral contrast used: None

 

FINDINGS:

CTA Abdomen and pelvis: 

In the visualized chest the ascending aorta measures 3.1 cm and the descending aorta is 2.4 cm. No si
gnificant atherosclerotic plaque or dilatation. Main pulmonary artery measures 2.4 cm, considered wit
hin normal limits.

Mild mixed atherosclerotic disease of the upper abdominal aorta, becoming moderate moving closer to t
he bifurcation. There is no evidence of AAA or dissection. The proximal celiac, superior mesenteric, 
single left renal artery and 3 right renal arteries show no definite significant narrowing. Infrarena
l aorta measures 1.7 cm. The bifurcation is patent. Mild to moderate mixed disease throughout the nikki
ateral common and external iliac arteries. No hemodynamically significant stenosis is seen on the rig
ht. On the left there is mixed plaque with about 50-60% stenosis seen in the left distal common iliac
. The left external iliac is then patent with mild disease throughout.

 

CTA Lower extremities: 

Right: The CFA shows mild mixed disease throughout without significant stenosis. The bifurcation is p
atent. The profunda femoris is patent. SFA is patent with mild scattered disease in the SFA without s
ignificant stenosis. Popliteal artery is patent, with mild disease without significant stenosis. The 
popliteal bifurcation is patent. Tibioperoneal trunk shows calcification distally without stenosis. T
he peroneal and posterior tibial arteries are patent proximally. The peroneal artery becomes diminuti
ve and faint, not visualized beyond the mid to distal calf. The posterior tibial is patent through th
e ankle. Anterior tibial artery becomes relatively faint and diminutive, but appears to be patent to 
the ankle.

 

Left: Common femoral artery is patent with mild disease throughout. Bifurcation is patent. Profunda f
emoris and SFA are patent. In the mid thigh, the SFA becomes nonopacified/occluded. About a centimete
r or so distally, there is reconstitution of the SFA by collateral vessels. The popliteal artery is p
atent. Tibioperoneal trunk, KASANDRA become somewhat diminutive and faint continuing distally, and above t
he ankle the peroneal becomes nonopacified the left anterior posterior tibial arteries normally paten
t to the ankle.

 

 

Non-CTA findings:

There are some limitations due to the angiographic phase of imaging.

 

LOWER CHEST: Heart size upper limits of normal. Trace pericardial fluid. Mild coronary arterial calci
fication.

ABDOMEN

LIVER: Unremarkable

GALLBLADDER AND BILE DUCTS: Unremarkable gallbladder. No biliary ductal dilatation.

PANCREAS: Moderate to severe fatty infiltration without acute finding

SPLEEN: Unremarkable.

ADRENAL GLANDS: Mildly thickened and small nodular appearance of the adrenals, could be due to hyperp
lasia and/or adenomatoid changes..

KIDNEYS AND URETERS: Kidneys enhance symmetrically. No evidence of hydronephrosis or visible renal ca
lculus. The ureters are unremarkable. Small bilateral renal cortical hypodensities are too small to c
haracterize but probably cysts.  

 

PELVIS

BLADDER: Incompletely distended/collapsed and not well evaluated.

REPRODUCTIVE: Prostate measures 3.9 cm. Pelvic phleboliths.

 

ABDOMEN & PELVIS

STOMACH AND BOWEL: Stomach and small bowel are nondistended, no evidence of obstruction.   The append
ix appears within normal limits.  There is some stool and gas seen throughout the colon with no focal
 acute abnormality shown.

There looks to be some mural fat deposition in the colon, especially the transverse and descending se
gments, which can be seen as the chronic sequela of prior inflammation. No acute inflammatory process
 is seen.  The appendix appears within normal limits.

PERITONEUM/RETROPERITONEUM:   No evidence of pneumoperitoneum or free fluid. 

VASCULATURE: As above.  Portal veins are enhancing.  Splenic vein is patent.

LYMPH NODES: No gross evidence for lymphadenopathy.

SOFT TISSUE/ABDOMINAL WALL: Postoperative changes likely from herniorrhaphy along the anterior pelvic
 wall near the inguinal regions. Small fat-containing inguinal and umbilical hernias.

MUSCULOSKELETAL: No acute osseous abnormalities. Moderate disc degeneration changes are present throu
ghout the thoracolumbar spine. IMPRESSION:

*  Mild to moderate mixed atherosclerotic disease of the abdominal aorta and branches.

*  Aorta is patent without significant stenosis or aneurysm.

*  Focal stenosis in the left external iliac artery with the lumen demonstrating a 50-60% diameter re
duction.

*  Left SFA becomes occluded in the mid to distal thigh for a short segment, then reconstitutes in th
e distal thigh. The popliteal artery is then patent.

*  Below the knee, there is a patent 2-vessel runoff to the ankle bilaterally.

*  Other chronic and likely incidental findings as above.

## 2024-02-02 ENCOUNTER — HOSPITAL ENCOUNTER (OUTPATIENT)
Dept: HOSPITAL 47 - CATHCVL | Age: 64
End: 2024-02-02
Attending: SURGERY
Payer: COMMERCIAL

## 2024-02-02 VITALS — DIASTOLIC BLOOD PRESSURE: 74 MMHG | HEART RATE: 58 BPM | SYSTOLIC BLOOD PRESSURE: 130 MMHG

## 2024-02-02 VITALS — RESPIRATION RATE: 16 BRPM | TEMPERATURE: 98.4 F

## 2024-02-02 DIAGNOSIS — Z87.891: ICD-10-CM

## 2024-02-02 DIAGNOSIS — Z79.82: ICD-10-CM

## 2024-02-02 DIAGNOSIS — Z79.899: ICD-10-CM

## 2024-02-02 DIAGNOSIS — I73.9: Primary | ICD-10-CM

## 2024-02-02 DIAGNOSIS — M10.9: ICD-10-CM

## 2024-02-02 LAB
ANION GAP SERPL CALC-SCNC: 6 MMOL/L
BASOPHILS # BLD AUTO: 0.1 K/UL (ref 0–0.2)
BASOPHILS NFR BLD AUTO: 1 %
BUN SERPL-SCNC: 19 MG/DL (ref 9–20)
CALCIUM SPEC-MCNC: 9.2 MG/DL (ref 8.4–10.2)
CHLORIDE SERPL-SCNC: 116 MMOL/L (ref 98–107)
CO2 SERPL-SCNC: 19 MMOL/L (ref 22–30)
EOSINOPHIL # BLD AUTO: 0.4 K/UL (ref 0–0.7)
EOSINOPHIL NFR BLD AUTO: 6 %
ERYTHROCYTE [DISTWIDTH] IN BLOOD BY AUTOMATED COUNT: 5.49 M/UL (ref 4.3–5.9)
ERYTHROCYTE [DISTWIDTH] IN BLOOD: 14.1 % (ref 11.5–15.5)
GLUCOSE SERPL-MCNC: 127 MG/DL (ref 74–99)
HCT VFR BLD AUTO: 48.3 % (ref 39–53)
HGB BLD-MCNC: 16.6 GM/DL (ref 13–17.5)
LYMPHOCYTES # SPEC AUTO: 1.6 K/UL (ref 1–4.8)
LYMPHOCYTES NFR SPEC AUTO: 22 %
MCH RBC QN AUTO: 30.2 PG (ref 25–35)
MCHC RBC AUTO-ENTMCNC: 34.3 G/DL (ref 31–37)
MCV RBC AUTO: 88 FL (ref 80–100)
MONOCYTES # BLD AUTO: 0.5 K/UL (ref 0–1)
MONOCYTES NFR BLD AUTO: 6 %
NEUTROPHILS # BLD AUTO: 4.5 K/UL (ref 1.3–7.7)
NEUTROPHILS NFR BLD AUTO: 63 %
PLATELET # BLD AUTO: 171 K/UL (ref 150–450)
POTASSIUM SERPL-SCNC: 4.4 MMOL/L (ref 3.5–5.1)
SODIUM SERPL-SCNC: 141 MMOL/L (ref 137–145)
WBC # BLD AUTO: 7.1 K/UL (ref 3.8–10.6)

## 2024-02-02 PROCEDURE — 37225: CPT

## 2024-02-02 PROCEDURE — 76937 US GUIDE VASCULAR ACCESS: CPT

## 2024-02-02 PROCEDURE — 85025 COMPLETE CBC W/AUTO DIFF WBC: CPT

## 2024-02-02 PROCEDURE — 80048 BASIC METABOLIC PNL TOTAL CA: CPT

## 2024-02-02 NOTE — P.OP
Date of Procedure: 02/02/24


Preoperative Diagnosis: 


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

calf claudication.


Postoperative Diagnosis: 


Same.


Procedure(s) Performed: 


1: Ultrasound-guided cannulation right common femoral artery.


2: Selective catheterization contralateral superficial femoral artery.


3: Left femoral angiogram.


4: Atherectomy left superficial femoral artery with balloon dilation utilizing 

drug-eluting balloon.


Anesthesia: local (With moderate conscious sedation.)


Surgeon: Rohit Gomes


Estimated Blood Loss (ml): 10


Pathology: none sent


Condition: stable


Disposition: no change


Indications for Procedure: 


Patient is a 63-year-old male with a history of tobacco use who presented with a

chief complaint of left calf claudication which he describes as lifestyle 

limiting.  Workup including a CTA demonstrated SFA occlusion at the adductor 

canal with adequate distal runoff.  We discussed continued medical management 

versus attempt at percutaneous revascularization.  The procedure, risk and 

benefits were discussed with the patient.  Patient wished to proceed.  Consent 

form was signed.


Description of Procedure: 


Patient was brought to the special procedure suite.  Both groins were sterilely 

prepped draped in usual manner.  Patient received 1 mg of Versed and 50 mcg of 

fentanyl via the intravenous route for moderate conscious sedation purposes.





Utilizing ultrasound the right common femoral artery was identified.  1% 

Xylocaine was utilized for local anesthesia tissues overlying the femoral 

artery.  Through this anesthetized area with the aid of ultrasound a 

multipurpose needle was utilized to cannulate the artery.  Once cannulated soft 

tipped guidewire was advanced in the artery.  The needle was withdrawn and a 6 

[place] sheath was placed.





Pigtail catheter in guidewire combination were utilized to cannulate the origin 

of the left common iliac artery.  Left iliac angiogram was performed.  Once 

adequate films were obtained the pigtail was exchanged for an angled glide 

catheter which was then positioned in the proximal SFA.  Left femoral angiogram 

was performed.  This demonstrated the previously identified occlusion.  Glide 

advantage guidewire and the angled glide catheter were utilized in combination 

to cross the lesion.  The angled glide catheter was exchanged for a 6 [place] up 

and over catheter.  The patient was systemically heparinized with 3500 units of 

heparin.  The glide catheter was readvanced over the guidewire past the level of

the occlusion.  The Glidewire was exchanged for an 014 wire.  Over the wire a 

silver [place] atherectomy device was advanced and utilized to perform atherectomy 

of the atherosclerotic segment of the SFA.  Completion angiogram demonstrated 

good cosmetic results.  Subsequently a 5 mm x 40 mm drug-eluting balloon was 

selected and utilized to balloon angioplasty the atherectomized segment.  

Completion angiogram demonstrated good cosmetic result with no evidence of 

dissection or other pathology.





With the above findings noted all wires and sheaths were removed and pressure 

was held at the puncture site until all evidence of bleeding ceased.  Patient 

tolerated the procedure well and was taken to the outpatient area in 

satisfactory and stable condition.





Total fluoroscopy: 8.0 minutes.


Moderate conscious sedation time: 39 minutes.


Total contrast volume: 25 mL of Isovue-370.





Plan - Discharge Summary


Discharge Rx Participant: No


New Discharge Prescriptions: 


No Action


   amLODIPine BESYLATE 10 mg PO QAM


   Aspirin 81 mg PO HS


   Indomethacin [Indocin] 50 mg PO AS DIRECTED PRN


     PRN Reason: gout


   lisinopriL 40 mg PO HS


   Atorvastatin [Lipitor] 20 mg PO DAILY


Discharge Medication List





amLODIPine BESYLATE 10 mg PO QAM 04/30/21 [History]


lisinopriL 40 mg PO HS 04/30/21 [History]


Aspirin 81 mg PO HS 01/29/24 [History]


Atorvastatin [Lipitor] 20 mg PO DAILY 01/29/24 [History]


Indomethacin [Indocin] 50 mg PO AS DIRECTED PRN 01/29/24 [History]

## 2024-02-02 NOTE — IR
EXAMINATION TYPE: IR pta femoral popliteal Intraoperative/procedural fluoroscopic services were provi
ded. 

 

CLINICAL INDICATION:Male, 63 years old with history of left leg pain, 8min fluoro, [id number]; , PH
H

 

Total fluoroscopy time is 8 min. 

DAP: Routine 0.832 Gycm2 

Please see the operative/procedural note for further details.

## 2024-11-08 ENCOUNTER — HOSPITAL ENCOUNTER (OUTPATIENT)
Dept: HOSPITAL 47 - RADMRIMAIN | Age: 64
Discharge: HOME | End: 2024-11-08
Attending: FAMILY MEDICINE
Payer: COMMERCIAL

## 2024-11-08 DIAGNOSIS — J32.9: ICD-10-CM

## 2024-11-08 DIAGNOSIS — G25.2: Primary | ICD-10-CM

## 2024-11-08 DIAGNOSIS — Z85.46: ICD-10-CM

## 2024-11-08 PROCEDURE — 70553 MRI BRAIN STEM W/O & W/DYE: CPT

## 2025-01-24 ENCOUNTER — HOSPITAL ENCOUNTER (OUTPATIENT)
Dept: HOSPITAL 47 - RADNMMAIN | Age: 65
Discharge: HOME | End: 2025-01-24
Attending: FAMILY MEDICINE
Payer: COMMERCIAL

## 2025-01-24 DIAGNOSIS — G25.2: Primary | ICD-10-CM

## 2025-01-24 PROCEDURE — 78803 RP LOCLZJ TUM SPECT 1 AREA: CPT

## 2025-01-25 NOTE — NM
EXAMINATION TYPE: NM DatScan Brain SPECT

 

DATE OF EXAM: 1/24/2025

 

COMPARISON: MR brain 11/8/2024

 

CLINICAL INDICATION: Male, 64 years old with history of G25.2 OTHER SPECIFIED FORMS OF TREMOR;

 

TECHNIQUE:  10 drops of Lugol's solution was administered 1 hour prior to injection as a thyroid bloc
praveena agent.  After the administration of 4.5 mCi I-123 Ioflupane DaTscan.  Images obtained 3 hours po
st injection.  SPECT images of the brain were acquired with axial and coronal reconstructions.  

 

FINDINGS: 

The uptake of radiotracer within the patient's caudate nuclei and putamina is symmetric and crescent-
shaped.

 

 

IMPRESSION: 

There is no scintigraphic evidence of a neurodegenerative disorder (Parkinson's disease, Multisystem 
atrophy or Progressive supranuclear palsy), as there is symmetric uptake of I-123 Ioflupan (DaTscan) 
within the caudate nuclei and putamina.

 

 

 

[facility], Workstation: [id number], 1/25/2025 11:25 AM